# Patient Record
Sex: MALE | URBAN - METROPOLITAN AREA
[De-identification: names, ages, dates, MRNs, and addresses within clinical notes are randomized per-mention and may not be internally consistent; named-entity substitution may affect disease eponyms.]

---

## 2020-08-28 ENCOUNTER — OFFICE VISIT (OUTPATIENT)
Dept: URBAN - METROPOLITAN AREA CLINIC 72 | Facility: CLINIC | Age: 65
End: 2020-08-28
Payer: COMMERCIAL

## 2020-08-28 DIAGNOSIS — H25.813 COMBINED FORMS OF AGE-RELATED CATARACT, BILATERAL: ICD-10-CM

## 2020-08-28 DIAGNOSIS — H40.013 OPEN ANGLE WITH BORDERLINE FINDINGS, LOW RISK, BILATERAL: ICD-10-CM

## 2020-08-28 PROCEDURE — 92002 INTRM OPH EXAM NEW PATIENT: CPT | Performed by: OPTOMETRIST

## 2020-08-28 ASSESSMENT — INTRAOCULAR PRESSURE
OS: 11
OD: 10

## 2020-08-28 ASSESSMENT — VISUAL ACUITY
OS: 20/20
OD: 20/20

## 2020-08-28 NOTE — IMPRESSION/PLAN
Impression: Open angle with borderline findings, low risk, bilateral: H40.013.

due to cupping of the optic nerve. iop stable without medications. nerves appear healthy OU. Plan: Borderline glaucoma, intraocular pressure stable without medication. Continue without medication and observe.

## 2020-10-16 ENCOUNTER — OFFICE VISIT (OUTPATIENT)
Dept: URBAN - METROPOLITAN AREA CLINIC 72 | Facility: CLINIC | Age: 65
End: 2020-10-16

## 2020-10-16 DIAGNOSIS — H52.4 PRESBYOPIA: Primary | ICD-10-CM

## 2020-10-16 PROCEDURE — V2799 MISC VISION ITEM OR SERVICE: HCPCS | Performed by: OPTOMETRIST

## 2020-10-16 ASSESSMENT — INTRAOCULAR PRESSURE
OS: 10
OD: 9

## 2020-10-16 NOTE — IMPRESSION/PLAN
Impression: Presbyopia: H52.4.

 - PDs off by 1mm from most recent measurement.  No symptoms reported w/ current Rx in phoroptor Plan: RTC PRN